# Patient Record
Sex: MALE | Race: WHITE | Employment: OTHER | URBAN - METROPOLITAN AREA
[De-identification: names, ages, dates, MRNs, and addresses within clinical notes are randomized per-mention and may not be internally consistent; named-entity substitution may affect disease eponyms.]

---

## 2024-07-18 ENCOUNTER — OFFICE VISIT (OUTPATIENT)
Age: 89
End: 2024-07-18
Payer: MEDICARE

## 2024-07-18 ENCOUNTER — APPOINTMENT (OUTPATIENT)
Age: 89
End: 2024-07-18
Payer: MEDICARE

## 2024-07-18 VITALS
SYSTOLIC BLOOD PRESSURE: 148 MMHG | BODY MASS INDEX: 28.14 KG/M2 | DIASTOLIC BLOOD PRESSURE: 78 MMHG | HEIGHT: 71 IN | WEIGHT: 201 LBS | HEART RATE: 56 BPM

## 2024-07-18 DIAGNOSIS — M25.562 CHRONIC PAIN OF LEFT KNEE: ICD-10-CM

## 2024-07-18 DIAGNOSIS — G89.29 CHRONIC PAIN OF LEFT KNEE: ICD-10-CM

## 2024-07-18 DIAGNOSIS — G89.29 CHRONIC PAIN OF RIGHT KNEE: ICD-10-CM

## 2024-07-18 DIAGNOSIS — M17.11 PRIMARY OSTEOARTHRITIS OF RIGHT KNEE: ICD-10-CM

## 2024-07-18 DIAGNOSIS — M25.561 CHRONIC PAIN OF RIGHT KNEE: ICD-10-CM

## 2024-07-18 DIAGNOSIS — M17.12 PRIMARY OSTEOARTHRITIS OF LEFT KNEE: Primary | ICD-10-CM

## 2024-07-18 PROCEDURE — 99203 OFFICE O/P NEW LOW 30 MIN: CPT | Performed by: ORTHOPAEDIC SURGERY

## 2024-07-18 PROCEDURE — 73564 X-RAY EXAM KNEE 4 OR MORE: CPT

## 2024-07-18 RX ORDER — DOCUSATE SODIUM 100 MG/1
100 CAPSULE, LIQUID FILLED ORAL 2 TIMES DAILY
COMMUNITY

## 2024-07-18 RX ORDER — AMLODIPINE BESYLATE 5 MG/1
TABLET ORAL
COMMUNITY

## 2024-07-18 RX ORDER — METOPROLOL TARTRATE 50 MG/1
50 TABLET, FILM COATED ORAL DAILY
COMMUNITY

## 2024-07-18 RX ORDER — APIXABAN 5 MG/1
TABLET, FILM COATED ORAL
COMMUNITY

## 2024-07-18 RX ORDER — TAMSULOSIN HYDROCHLORIDE 0.4 MG/1
CAPSULE ORAL
COMMUNITY

## 2024-07-18 RX ORDER — MIRABEGRON 25 MG/1
25 TABLET, FILM COATED, EXTENDED RELEASE ORAL DAILY
COMMUNITY
Start: 2024-07-12

## 2024-07-18 RX ORDER — SULFAMETHOXAZOLE AND TRIMETHOPRIM 800; 160 MG/1; MG/1
TABLET ORAL
COMMUNITY

## 2024-07-18 RX ORDER — METHYLPREDNISOLONE 4 MG/1
TABLET ORAL
COMMUNITY

## 2024-07-18 RX ORDER — FINASTERIDE 5 MG/1
5 TABLET, FILM COATED ORAL DAILY
COMMUNITY
Start: 2024-07-12

## 2024-07-18 RX ORDER — METOPROLOL SUCCINATE 50 MG/1
TABLET, EXTENDED RELEASE ORAL
COMMUNITY
Start: 2024-06-24

## 2024-07-18 RX ORDER — ATORVASTATIN CALCIUM 10 MG/1
TABLET, FILM COATED ORAL
COMMUNITY

## 2024-07-18 NOTE — PROGRESS NOTES
"CHIEF COMPLAINT/REASON FOR VISIT  Chief Complaint   Patient presents with    Left Knee - Pain     Pt states that he has been having geovanna knee instability and weakness with no hx of any trauma.     Right Knee - Pain        HISTORY OF PRESENT ILLNESS  Ethan Soto is a 88 y.o. male who presents for evaluation of his right knee. Today, patient reports minimal to no pain about his knees but some days it is hard for him to ambulate due to the pain. His symptoms have been present for a few months now without any acute injury or trauma. When his pain is present it is localized diffusely about both knees. Patients main complaint is balance issues and \"instability\" about the lower extremities. He denies any previous injuries or surgeries. No numbness or tingling. No fevers or chills.     REVIEW OF SYSTEMS  Review of systems was performed and, outside that mentioned in the HPI, it was negative for symptomology related to the integumentary, hematologic, immunologic, allergic, neurologic, cardiovascular, respiratory, GI or  systems.    MEDICAL HISTORY  There is no problem list on file for this patient.      SURGICAL HISTORY  No past surgical history on file.    CURRENT MEDICATIONS    Current Outpatient Medications:     amLODIPine (NORVASC) 5 mg tablet, , Disp: , Rfl:     atorvastatin (LIPITOR) 10 mg tablet, , Disp: , Rfl:     Cholecalciferol 25 MCG (1000 UT) CHEW, Chew, Disp: , Rfl:     docusate sodium (Colace) 100 mg capsule, Take 100 mg by mouth 2 (two) times a day, Disp: , Rfl:     Eliquis 5 MG, , Disp: , Rfl:     finasteride (PROSCAR) 5 mg tablet, Take 5 mg by mouth daily, Disp: , Rfl:     methylPREDNISolone 4 MG tablet therapy pack, , Disp: , Rfl:     metoprolol succinate (TOPROL-XL) 50 mg 24 hr tablet, , Disp: , Rfl:     metoprolol tartrate (LOPRESSOR) 50 mg tablet, Take 50 mg by mouth daily, Disp: , Rfl:     Mirabegron ER 25 MG TB24, Take 25 mg by mouth daily, Disp: , Rfl:     psyllium (METAMUCIL) 0.52 g capsule, Take 3 " "capsules by mouth daily, Disp: , Rfl:     sulfamethoxazole-trimethoprim (BACTRIM DS) 800-160 mg per tablet, , Disp: , Rfl:     tamsulosin (FLOMAX) 0.4 mg, , Disp: , Rfl:     SOCIAL HISTORY  Social History     Socioeconomic History    Marital status: /Civil Union     Spouse name: Not on file    Number of children: Not on file    Years of education: Not on file    Highest education level: Not on file   Occupational History    Not on file   Tobacco Use    Smoking status: Not on file    Smokeless tobacco: Not on file   Substance and Sexual Activity    Alcohol use: Not on file    Drug use: Not on file    Sexual activity: Not on file   Other Topics Concern    Not on file   Social History Narrative    Not on file     Social Determinants of Health     Financial Resource Strain: Not on file   Food Insecurity: Not on file   Transportation Needs: Not on file   Physical Activity: Not on file   Stress: Not on file   Social Connections: Not on file   Intimate Partner Violence: Not on file   Housing Stability: Not on file       Objective     VITAL SIGNS  /78   Pulse 56   Ht 5' 11\" (1.803 m)   Wt 91.2 kg (201 lb)   BMI 28.03 kg/m²        PHYSICAL EXAMINATION    Musculoskeletal: Bilateral Knee Examination:  General: The patient is alert, oriented, and pleasant to interact with.  Patient ambulates with Antalgic gait pattern  Assistive Device: No  Alignment: mild valgus  Skin is warm and dry to touch with no signs of erythema, ecchymosis, or infection   Effusion: none  ROM: 0° - 135°  verus 0° - 135° on contralateral side  MMT: 5/5 throughout  TTP: None  Flexor and extensor mechanisms are intact   Knee is stable to varus and valgus stress  Anna's Test Negative    Lachman's Test - 1A  Anterior Drawer Test - 1A  Posterior Drawer Test - 1A  Pivot Shift - 0  Lateral Patellar Glide - 1/4  Medial Patellar Glide - 1/4  Patella tracks centrally without palpable crepitus  Calf compartments are soft and supple  negative " Chema's sign  2+ DP and PT pulses with brisk capillary refill to the toes  Sural, saphenous, tibial, superficial, and deep peroneal motor and sensory distributions intact  Sensation light touch intact distally      RADIOGRAPHIC EXAMINATION/DIAGNOSTICS:    X Rays of bilateral knees show severe medial compartment osteoarthritis. No other acute osseous abnormalities.     ASSESSMENT/PLAN:  Bilateral knee severe osteoarthritis with general deconditioning  Explained to the patient that as he is not experiencing any pain about his knees, he will continue to monitor his symptoms. No further treatment for his knees are needed at this time.   It appears that his main compliant today is general deconditioning and balance issues of his lower extremities. He denies numbness or tingling. He was offered a referral to formal physical therapy but he declined. He could consider a referral to physical therapy in the future. He understood and all questions were answered  Continue to monitor these symptoms. May perform activities as tolerated without restrictions  He will follow up on an as needed basis        Scribe Attestation      I,:  Abhishek Espinosa am acting as a scribe while in the presence of the attending physician.:       I,:  Chas Posada MD personally performed the services described in this documentation    as scribed in my presence.: